# Patient Record
Sex: FEMALE | Race: WHITE | NOT HISPANIC OR LATINO | Employment: OTHER | URBAN - METROPOLITAN AREA
[De-identification: names, ages, dates, MRNs, and addresses within clinical notes are randomized per-mention and may not be internally consistent; named-entity substitution may affect disease eponyms.]

---

## 2017-02-03 ENCOUNTER — HOSPITAL ENCOUNTER (OUTPATIENT)
Dept: MAMMOGRAPHY | Facility: CLINIC | Age: 62
Discharge: HOME | End: 2017-02-03

## 2017-02-03 DIAGNOSIS — Z12.31 VISIT FOR SCREENING MAMMOGRAM: ICD-10-CM

## 2018-06-28 ENCOUNTER — HOSPITAL ENCOUNTER (OUTPATIENT)
Dept: MAMMOGRAPHY | Facility: CLINIC | Age: 63
Discharge: HOME | End: 2018-06-28

## 2018-06-28 DIAGNOSIS — Z12.31 VISIT FOR SCREENING MAMMOGRAM: ICD-10-CM

## 2019-07-23 ENCOUNTER — HOSPITAL ENCOUNTER (OUTPATIENT)
Dept: MAMMOGRAPHY | Facility: CLINIC | Age: 64
Discharge: HOME | End: 2019-07-23

## 2019-07-23 DIAGNOSIS — Z12.31 VISIT FOR SCREENING MAMMOGRAM: ICD-10-CM

## 2020-12-29 ENCOUNTER — HOSPITAL ENCOUNTER (OUTPATIENT)
Dept: MAMMOGRAPHY | Facility: CLINIC | Age: 65
Discharge: HOME | End: 2020-12-29

## 2020-12-29 DIAGNOSIS — Z12.31 VISIT FOR SCREENING MAMMOGRAM: ICD-10-CM

## 2021-05-01 ENCOUNTER — HEALTH MAINTENANCE LETTER (OUTPATIENT)
Age: 66
End: 2021-05-01

## 2021-05-26 ENCOUNTER — RECORDS - HEALTHEAST (OUTPATIENT)
Dept: ADMINISTRATIVE | Facility: CLINIC | Age: 66
End: 2021-05-26

## 2021-05-27 ENCOUNTER — RECORDS - HEALTHEAST (OUTPATIENT)
Dept: ADMINISTRATIVE | Facility: CLINIC | Age: 66
End: 2021-05-27

## 2021-05-29 ENCOUNTER — RECORDS - HEALTHEAST (OUTPATIENT)
Dept: ADMINISTRATIVE | Facility: CLINIC | Age: 66
End: 2021-05-29

## 2021-05-30 ENCOUNTER — RECORDS - HEALTHEAST (OUTPATIENT)
Dept: ADMINISTRATIVE | Facility: CLINIC | Age: 66
End: 2021-05-30

## 2021-06-16 PROBLEM — K82.4 GALLBLADDER POLYP: Status: ACTIVE | Noted: 2017-12-19

## 2021-06-16 PROBLEM — I72.8 SPLENIC ARTERY ANEURYSM (H): Status: ACTIVE | Noted: 2018-01-24

## 2021-06-16 PROBLEM — S83.209A CURRENT TEAR OF MENISCUS OF KNEE: Status: ACTIVE | Noted: 2017-06-16

## 2021-06-16 PROBLEM — M17.11 ARTHRITIS OF KNEE, RIGHT: Status: ACTIVE | Noted: 2017-06-16

## 2021-07-13 ENCOUNTER — RECORDS - HEALTHEAST (OUTPATIENT)
Dept: ADMINISTRATIVE | Facility: CLINIC | Age: 66
End: 2021-07-13

## 2021-07-21 ENCOUNTER — RECORDS - HEALTHEAST (OUTPATIENT)
Dept: ADMINISTRATIVE | Facility: CLINIC | Age: 66
End: 2021-07-21

## 2021-07-22 ENCOUNTER — RECORDS - HEALTHEAST (OUTPATIENT)
Dept: SCHEDULING | Facility: CLINIC | Age: 66
End: 2021-07-22

## 2021-07-22 DIAGNOSIS — Z12.31 OTHER SCREENING MAMMOGRAM: ICD-10-CM

## 2021-10-11 ENCOUNTER — HEALTH MAINTENANCE LETTER (OUTPATIENT)
Age: 66
End: 2021-10-11

## 2022-05-22 ENCOUNTER — HEALTH MAINTENANCE LETTER (OUTPATIENT)
Age: 67
End: 2022-05-22

## 2022-07-17 ENCOUNTER — HEALTH MAINTENANCE LETTER (OUTPATIENT)
Age: 67
End: 2022-07-17

## 2022-09-24 ENCOUNTER — HEALTH MAINTENANCE LETTER (OUTPATIENT)
Age: 67
End: 2022-09-24

## 2023-05-08 ENCOUNTER — HEALTH MAINTENANCE LETTER (OUTPATIENT)
Age: 68
End: 2023-05-08

## 2023-10-14 ENCOUNTER — HEALTH MAINTENANCE LETTER (OUTPATIENT)
Age: 68
End: 2023-10-14

## 2024-01-01 ENCOUNTER — HOSPITAL ENCOUNTER (EMERGENCY)
Facility: CLINIC | Age: 69
Discharge: HOME | End: 2024-01-01
Admitting: PHYSICIAN ASSISTANT
Payer: COMMERCIAL

## 2024-01-01 VITALS
DIASTOLIC BLOOD PRESSURE: 74 MMHG | HEART RATE: 72 BPM | OXYGEN SATURATION: 100 % | WEIGHT: 160 LBS | RESPIRATION RATE: 18 BRPM | TEMPERATURE: 98.3 F | BODY MASS INDEX: 24.69 KG/M2 | SYSTOLIC BLOOD PRESSURE: 161 MMHG

## 2024-01-01 DIAGNOSIS — H10.9 BACTERIAL CONJUNCTIVITIS OF BOTH EYES: ICD-10-CM

## 2024-01-01 DIAGNOSIS — H65.92 MIDDLE EAR EFFUSION, LEFT: ICD-10-CM

## 2024-01-01 DIAGNOSIS — B96.89 BACTERIAL CONJUNCTIVITIS OF BOTH EYES: ICD-10-CM

## 2024-01-01 DIAGNOSIS — J01.11 ACUTE RECURRENT FRONTAL SINUSITIS: ICD-10-CM

## 2024-01-01 PROCEDURE — 99284 EMERGENCY DEPT VISIT MOD MDM: CPT

## 2024-01-01 RX ORDER — LORATADINE 10 MG/1
10 TABLET ORAL DAILY
Qty: 7 TABLET | Refills: 0 | Status: SHIPPED | OUTPATIENT
Start: 2024-01-01 | End: 2024-01-08

## 2024-01-01 RX ORDER — OXYMETAZOLINE HYDROCHLORIDE 0.05 G/100ML
2 SPRAY NASAL 2 TIMES DAILY
Qty: 1 ML | Refills: 0 | Status: SHIPPED | OUTPATIENT
Start: 2024-01-01 | End: 2024-01-04

## 2024-01-01 RX ORDER — ERYTHROMYCIN 5 MG/G
0.5 OINTMENT OPHTHALMIC 4 TIMES DAILY
Qty: 3.5 G | Refills: 0 | Status: SHIPPED | OUTPATIENT
Start: 2024-01-01 | End: 2024-01-08

## 2024-01-01 ASSESSMENT — VISUAL ACUITY
OD: 20/40
OU: 20/40
OS: 20/40

## 2024-01-02 NOTE — ED PROVIDER NOTES
Emergency Department Encounter   NAME: Marni Lane ; AGE: 68 year old female ; YOB: 1955 ; MRN: [id number] ; PCP: Ana María David   ED PROVIDER: Blanca Aquino PA-C    Evaluation Date & Time:   No admission date for patient encounter.    CHIEF COMPLAINT:  Eye Drainage, Cough, and Pharyngitis      Impression and Plan   MDM:   Marni Lane is a 68 year old female with a history of hypertension who presents to the ED by walk-in for evaluation of eye drainage.  The patient has had 4 days of bilateral eye redness, irritation, drainage, nasal congestion, mild sore throat, and sinus pressure.  Her daughter is ill with similar symptoms, and was treated for a sinus infection with oral antibiotics.  Patient was seen at urgent care 3 days ago and was diagnosed with bilateral conjunctivitis and provided a prescription for Polytrim drops, however after starting these drops, she had irritation around both of her eyes so she discontinued them yesterday.  Here in the ER, this is a very well-appearing individual, who is pleasant, and in no acute distress.  She has bilateral conjunctival injection with purulent drainage, clinically consistent with bacterial conjunctivitis.  She has no periorbital swelling or edema concerning for preseptal cellulitis.  No pain behind the eyes or pain with EOMs -low suspicion for a septal cellulitis.  Obtained pressures in bilateral eyes which were within normal limit -no concern for acute open angle glaucoma.  She does have 20 out of 40 vision in her bilateral eyes with her glasses on, though suspect some of this may be due to her eye drainage. No significant pain, photophobia, or difficulty keeping eyes open. No evidence of iritis, uveitis or keratitis. She states that she had a full ophthalmologic exam several weeks ago which was unremarkable.  She has a relationship with her ophthalmologist and will contact them tomorrow to schedule close follow-up, though feel  ultimately she needs a change of her antibiotic drops.  Will switch her to erythromycin as she is a noncontact wearer.    She is having some tenderness to her right frontal sinus and has nasal congestion.  She states she has a history of recurrent sinus infections and has considered sinus surgery in the past.  As she has only had symptoms for 4 days without fever, discussed high likelihood that this is a viral sinusitis and will place her on Claritin and Afrin spray especially if she has a left middle ear effusion.  She is comfortable initiating these medications though would like to be prescribed antibiotics given her recurrent infections.  We discussed a prescription for Augmentin for home though not starting this unless her symptoms or not improving within the next 3 days she verbalized understanding.  We reviewed antibiotic side effects.  At this time, she is otherwise well-appearing.  Offered her viral swab, however she declined.  We reviewed concerning signs and symptoms return to the ED and she verbalized understanding.  Discharged home in stable condition.  Will have blood pressure rechecked in her primary care clinic.    Medical Decision Making    History:  Supplemental history from: Yes -    External Record(s) reviewed: Outpatient Record: [facility] on 29-Dec-2023    Work Up:  Chart documentation includes differential considered and any EKGs or imaging independently interpreted by provider, where specified.  In additional to work up documented, I considered the following work up: Documented in chart, if applicable.    External consultation:  Discussion of management with another provider: Documented in chart, if applicable    Complicating factors:  Care impacted by chronic illness: Hypertension  Care affected by social determinants of health: Access to care.     Disposition considerations: Discharge. I prescribed additional prescription strength medication(s) as charted.  See documentation for any additional details.      ED COURSE:  6:39 PM I met and introduced myself to the patient. I gathered initial history and performed my physical exam. We discussed plan for initial workup.   7:06 PM I checked the pressure of the patient's eyes. I discussed results, discharge, follow up, and reasons to return to the ED.     At the conclusion of the encounter I discussed the results of all the tests and the disposition. The questions were answered. The patient or family acknowledged understanding and was agreeable with the care plan.    FINAL IMPRESSION:    ICD-10-CM    1. Bacterial conjunctivitis of both eyes  H10.9     B96.89       2. Acute recurrent frontal sinusitis  J01.11       3. Middle ear effusion, left  H65.92             MEDICATIONS GIVEN IN THE EMERGENCY DEPARTMENT:  Medications - No data to display      NEW PRESCRIPTIONS STARTED AT TODAY'S ED VISIT:  Discharge Medication List as of 1/1/2024  7:34 PM        START taking these medications    Details   amoxicillin-clavulanate (AUGMENTIN) 875-125 MG tablet Take 1 tablet by mouth 2 times daily for 5 days, Disp-10 tablet, R-0, Local Print      erythromycin (ROMYCIN) 5 MG/GM ophthalmic ointment Place 0.5 inches into both eyes 4 times daily for 7 daysDisp-3.5 g, R-0Local Print      loratadine (CLARITIN) 10 MG tablet Take 1 tablet (10 mg) by mouth daily for 7 days, Disp-7 tablet, R-0, Local Print      oxymetazoline (AFRIN NASAL SPRAY) 0.05 % nasal spray Spray 2 sprays in nostril 2 times daily for 3 days, Disp-1 mL, R-0, Local PrintNo drip 12 hour formula please               HPI   Patient information was obtained from: patient    Use of Intrepreter: N/A     Marni Lane is a 68 year old female with a history of hypertension who presents to the ED by walk-in for evaluation of eye drainage.    Last Thursday (4 days ago) the patient began experiencing redness in her right eye. The next day, she presented to urgent care and was prescribed  antibiotics drops for her eye. The patient had an adverse reaction to the drops and discontinued them. The eye redness worsened, spreading to her left eye on Saturday; she also had bilateral pus and eye pain, as well as a  left sided headache that she has been taking advil and Tylenol for. Her eyes have felt more irritated and she has abnormally blurred vision, so she contacted her primary care provider today and was advised to present to the ER.    4 days ago, the patient also developed a sore throat, and today developed otalgia. She has a history of recurrent sinus infections, and her daughter who lives with her recently was diagnosed with a sinus infection. An at-home covid test was negative on Friday, and she recently had Covid at the end of October (about 2-2.5 months ago).    Her last eye exam was at the beginning of December (about 1 month ago). Patient had a codeine and calcium channel blocker allergy. She denies double vision, photophobia, pain behind her eyes, facial pressure, shortness of breath, dyspnea.    Per chart review, the patient presented to [facility] [place] on 29-Dec-2023 for evaluation of eye irritation. Diagnosed with bacterial conjunctivitis and subconjunctival hemorrhage of right eye.    REVIEW OF SYSTEMS:  Pertinent positive and negative symptoms per HPI.       Medical History     Past Medical History:   Diagnosis Date    Breast cyst Late 90's    Hypertension     Scleroma        Past Surgical History:   Procedure Laterality Date    BIOPSY BREAST Right     BREAST CYST ASPIRATION Left     HYSTERECTOMY  2001    OOPHORECTOMY  2001       Family History   Problem Relation Age of Onset    Cancer Father 80.00        Leukemia    Breast Cancer Maternal Aunt 65.00       Social History     Tobacco Use    Smoking status: Never   Substance Use Topics    Alcohol use: Yes     Comment: Alcoholic Drinks/day: occasional       amoxicillin-clavulanate (AUGMENTIN) 875-125 MG  tablet  erythromycin (ROMYCIN) 5 MG/GM ophthalmic ointment  loratadine (CLARITIN) 10 MG tablet  oxymetazoline (AFRIN NASAL SPRAY) 0.05 % nasal spray          Physical Exam     First Vitals:  Patient Vitals for the past 24 hrs:   BP Temp Temp src Pulse Resp SpO2 Weight   01/01/24 1953 (!) 161/74 -- -- 72 18 100 % --   01/01/24 1737 (!) 184/83 98.3  F (36.8  C) Temporal 74 14 100 % 72.6 kg (160 lb)         PHYSICAL EXAM:   Physical Exam  Vitals and nursing note reviewed.   Constitutional:       General: She is not in acute distress.     Appearance: She is not toxic-appearing.   HENT:      Head:      Comments: Tenderness over right frontal sinus.  No edema.     Right Ear: Tympanic membrane and ear canal normal.      Ears:      Comments: She has a clear left-sided middle ear effusion.  The TM is not erythematous, bulging or retracted.  No TM perforation and canals clear.     Nose: Rhinorrhea present.      Mouth/Throat:      Mouth: Mucous membranes are moist.      Comments: Uvula is midline without deviation.  No erythema, exudates, or tonsillar edema.  Eyes:      Comments: She has diffuse conjunctival injection, worse on the right compared to the left with purulent drainage.  Pupils are round, equal, and reactive to light without pain with direct or indirect light reflex.  EOMs are intact without pain.  No periorbital swelling or erythema.  Pressure in right eye 11 and 10 in the left eye.   Cardiovascular:      Rate and Rhythm: Normal rate and regular rhythm.      Heart sounds: Normal heart sounds.   Pulmonary:      Effort: Pulmonary effort is normal.      Breath sounds: Normal breath sounds. No wheezing, rhonchi or rales.   Musculoskeletal:      Cervical back: Normal range of motion and neck supple.   Skin:     General: Skin is warm.   Neurological:      Mental Status: She is alert.             Results     LAB:  All pertinent labs reviewed and interpreted  Labs Ordered and Resulted from Time of ED Arrival to Time of ED  Departure - No data to display    RADIOLOGY:  No orders to display         I, Denzel Gregory, am serving as a scribe to document services personally performed by Blanca Aquino PA-C, based on my observation and the provider's statements to me. I, Blanca Aquino PA-C attest that Denzel Gregory is acting in a scribe capacity, has observed my performance of the services and has documented them in accordance with my direction.       Blanca Aquino PA-C   Emergency Medicine   [facility] EMERGENCY ROOM       Blanca Aquino PA-C  01/01/24 2051       Blanca Aquino PA-C  01/01/24 2053

## 2024-01-02 NOTE — DISCHARGE INSTRUCTIONS
As we discussed, we will place you on a different antibiotic for your eye infection.  Please take and finish this as prescribed.    Your sinus infection is likely due to a virus.  Please use the daily Claritin and 3 days of Afrin sprays to help with your symptoms.  If your symptoms or not getting better in 3 days, you may start the antibiotic prescription provided.    If it anytime you develop a fever, redness or swelling around your eyes, worsening vision, pain behind the eyes,, or any new or concerning symptoms please return to the ER for further evaluation.    Your blood pressure was elevated in the emergencydepartment today and requires recheck and close follow-up in your primary care clinic. Untreated blood pressure can cause serious complications including, but not limited to stroke, heart attack/failure, and kidney disease.  Please make a close follow-up appointment to have this recheck performed. Please return to the emergency department immediately if you develop a severe headache, vision changes, chest pain, shortness of breath, orabdominal pain.

## 2024-01-02 NOTE — ED NOTES
Pt and spouse agree with discharge instructions. Will  prescriptions and use as directed. No other questions at this time. See providers notes for further assessment.

## 2024-09-19 ENCOUNTER — APPOINTMENT (OUTPATIENT)
Dept: CT IMAGING | Facility: CLINIC | Age: 69
End: 2024-09-19
Attending: FAMILY MEDICINE
Payer: COMMERCIAL

## 2024-09-19 PROCEDURE — 250N000011 HC RX IP 250 OP 636: Performed by: FAMILY MEDICINE

## 2024-09-19 PROCEDURE — 72131 CT LUMBAR SPINE W/O DYE: CPT

## 2024-09-19 PROCEDURE — 99285 EMERGENCY DEPT VISIT HI MDM: CPT | Mod: 25

## 2024-09-19 PROCEDURE — 250N000012 HC RX MED GY IP 250 OP 636 PS 637: Performed by: FAMILY MEDICINE

## 2024-09-19 PROCEDURE — 96372 THER/PROPH/DIAG INJ SC/IM: CPT | Performed by: FAMILY MEDICINE

## 2024-09-19 RX ORDER — KETOROLAC TROMETHAMINE 30 MG/ML
30 INJECTION, SOLUTION INTRAMUSCULAR; INTRAVENOUS ONCE
Status: COMPLETED | OUTPATIENT
Start: 2024-09-19 | End: 2024-09-19

## 2024-09-19 RX ORDER — PREDNISONE 20 MG/1
40 TABLET ORAL ONCE
Status: COMPLETED | OUTPATIENT
Start: 2024-09-19 | End: 2024-09-19

## 2024-09-19 RX ORDER — ORPHENADRINE CITRATE 30 MG/ML
30 INJECTION INTRAMUSCULAR; INTRAVENOUS ONCE
Status: COMPLETED | OUTPATIENT
Start: 2024-09-19 | End: 2024-09-19

## 2024-09-19 RX ORDER — OXYCODONE HYDROCHLORIDE 5 MG/1
5 TABLET ORAL EVERY 6 HOURS PRN
Qty: 6 TABLET | Refills: 0 | Status: SHIPPED | OUTPATIENT
Start: 2024-09-19 | End: 2024-09-20

## 2024-09-19 RX ORDER — LORAZEPAM 0.5 MG/1
0.5 TABLET ORAL ONCE
Status: COMPLETED | OUTPATIENT
Start: 2024-09-19 | End: 2024-09-20

## 2024-09-19 RX ORDER — GABAPENTIN 300 MG/1
300 CAPSULE ORAL 3 TIMES DAILY
Qty: 30 CAPSULE | Refills: 0 | Status: SHIPPED | OUTPATIENT
Start: 2024-09-19

## 2024-09-19 RX ORDER — PREDNISONE 10 MG/1
TABLET ORAL
Qty: 22 TABLET | Refills: 0 | Status: SHIPPED | OUTPATIENT
Start: 2024-09-19 | End: 2024-09-30

## 2024-09-19 RX ADMIN — PREDNISONE 40 MG: 20 TABLET ORAL at 22:31

## 2024-09-19 RX ADMIN — ORPHENADRINE CITRATE 30 MG: 60 INJECTION INTRAMUSCULAR; INTRAVENOUS at 21:47

## 2024-09-19 RX ADMIN — KETOROLAC TROMETHAMINE 30 MG: 30 INJECTION, SOLUTION INTRAMUSCULAR at 21:45

## 2024-09-19 ASSESSMENT — COLUMBIA-SUICIDE SEVERITY RATING SCALE - C-SSRS
6. HAVE YOU EVER DONE ANYTHING, STARTED TO DO ANYTHING, OR PREPARED TO DO ANYTHING TO END YOUR LIFE?: NO
1. IN THE PAST MONTH, HAVE YOU WISHED YOU WERE DEAD OR WISHED YOU COULD GO TO SLEEP AND NOT WAKE UP?: NO
2. HAVE YOU ACTUALLY HAD ANY THOUGHTS OF KILLING YOURSELF IN THE PAST MONTH?: NO

## 2024-09-20 ENCOUNTER — HOSPITAL ENCOUNTER (EMERGENCY)
Facility: CLINIC | Age: 69
Discharge: HOME | End: 2024-09-20
Attending: FAMILY MEDICINE | Admitting: FAMILY MEDICINE
Payer: COMMERCIAL

## 2024-09-20 VITALS
HEART RATE: 76 BPM | OXYGEN SATURATION: 96 % | SYSTOLIC BLOOD PRESSURE: 190 MMHG | TEMPERATURE: 97.7 F | RESPIRATION RATE: 17 BRPM | HEIGHT: 67 IN | DIASTOLIC BLOOD PRESSURE: 86 MMHG | WEIGHT: 160 LBS | BODY MASS INDEX: 25.11 KG/M2

## 2024-09-20 DIAGNOSIS — S39.012A LUMBAR STRAIN, INITIAL ENCOUNTER: ICD-10-CM

## 2024-09-20 DIAGNOSIS — M51.26 LUMBAR HERNIATED DISC: ICD-10-CM

## 2024-09-20 PROCEDURE — 250N000013 HC RX MED GY IP 250 OP 250 PS 637: Performed by: STUDENT IN AN ORGANIZED HEALTH CARE EDUCATION/TRAINING PROGRAM

## 2024-09-20 PROCEDURE — 250N000013 HC RX MED GY IP 250 OP 250 PS 637: Performed by: FAMILY MEDICINE

## 2024-09-20 RX ORDER — CYCLOBENZAPRINE HCL 10 MG
10 TABLET ORAL
Qty: 10 TABLET | Refills: 0 | Status: SHIPPED | OUTPATIENT
Start: 2024-09-20 | End: 2024-09-26

## 2024-09-20 RX ORDER — ACETAMINOPHEN 325 MG/1
975 TABLET ORAL ONCE
Status: COMPLETED | OUTPATIENT
Start: 2024-09-20 | End: 2024-09-20

## 2024-09-20 RX ORDER — ONDANSETRON 2 MG/ML
4 INJECTION INTRAMUSCULAR; INTRAVENOUS ONCE
Status: DISCONTINUED | OUTPATIENT
Start: 2024-09-20 | End: 2024-09-20

## 2024-09-20 RX ORDER — MORPHINE SULFATE 4 MG/ML
4 INJECTION, SOLUTION INTRAMUSCULAR; INTRAVENOUS ONCE
Status: DISCONTINUED | OUTPATIENT
Start: 2024-09-20 | End: 2024-09-20

## 2024-09-20 RX ADMIN — LORAZEPAM 0.5 MG: 0.5 TABLET ORAL at 00:20

## 2024-09-20 RX ADMIN — ACETAMINOPHEN 975 MG: 325 TABLET ORAL at 01:42

## 2024-09-20 ASSESSMENT — ACTIVITIES OF DAILY LIVING (ADL): ADLS_ACUITY_SCORE: 33

## 2024-09-20 NOTE — DISCHARGE INSTRUCTIONS
As we discussed, please take the medication as prescribed including the steroid, gabapentin, as well as Tylenol and ibuprofen for pain.    Please take 500mg of tylenol every 4 hours as well as 600mg of ibuprofen every 6 hours for pain. Do not take more than 4,000mg of tylenol in 24hrs.    I would also ice your back for the next few days at least 3-5 times daily.    Return for any bowel or bladder incontinence or retention, leg weakness, numbness in your genital region, or any other concerning symptoms.    The spine center should be reaching out to tomorrow morning.

## 2024-09-20 NOTE — Clinical Note
Marni Lane was seen and treated in our emergency department on 9/19/2024.  She may return to work on 09/23/2024.       If you have any questions or concerns, please don't hesitate to call.      Ohl, Varghese Colindres, DO

## 2024-09-20 NOTE — ED NOTES
"EMERGENCY DEPARTMENT SIGN OUT NOTE        ED COURSE AND MEDICAL DECISION MAKING  Patient was signed out to me by Dr Lawson Carter at 10:50 PM    In brief, Marni Lane is a 69 year old female who initially presented with back injury.     At time of sign out, disposition was pending MRI    Additional ED Course Timestamps    Reevaluation of patient approximately 1:30 AM: Patient unable to tolerate MRI laying flat because of increasing right thigh and posterior thigh pain.  Continues to deny any red flag symptoms.  Patient requesting to leave with outpatient follow-up reporting that she would like to see if the medications can \"soaking\" and then she can tolerate MRI.  I think this is completely reasonable.  Repeat physical examination showing again no weakness or red flag symptoms.  Rapid referral given for spine center      FINAL IMPRESSION    1. Lumbar strain, initial encounter        ED MEDS  Medications   ketorolac (TORADOL) injection 30 mg (30 mg Intramuscular $Given 9/19/24 2145)   orphenadrine (NORFLEX) injection 30 mg (30 mg Intramuscular $Given 9/19/24 2147)   predniSONE (DELTASONE) tablet 40 mg (40 mg Oral $Given 9/19/24 2231)       LAB  Labs Ordered and Resulted from Time of ED Arrival to Time of ED Departure - No data to display      RADIOLOGY    CT Lumbar Spine w/o Contrast   Final Result   IMPRESSION:   1.  No fracture or posttraumatic subluxation.   2.  Appearance of spinal canal or at L3-4 suggests there is a large disc protrusion with marked canal narrowing. This is not well-visualized without intrathecal contrast. MRI lumbar spine recommended.      Lumbar spine MRI w/o contrast    (Results Pending)       DISCHARGE MEDS  New Prescriptions    No medications on file       [facility] EMERGENCY ROOM  [address] [id number] [phone]         Ohl, Varghese Colindres,   09/20/24 0136    "

## 2024-09-20 NOTE — ED NOTES
Pt unable to complete MRI, having too much pain.  Pt reports allergy to codeine is hallucinations and vomiting, so will no give IV morphine. Pt will go back to ED for update on plan of care.

## 2024-09-20 NOTE — ED TRIAGE NOTES
Pt presents to ED with c/o lower back pain that started when she was squatting down in her fridge to replace a shelf.  Pt notes a pulling sensation alonger her entire lower back.  Now having pain that radiates into her RLE.  No previous injuries/back problems.  Pt took 2 advil around 1700.  Pain 2/10 when sitting, up to 10/10 when attempting to ambulate/      Triage Assessment (Adult)       Row Name 09/19/24 2049          Triage Assessment    Airway WDL WDL        Respiratory WDL    Respiratory WDL WDL        Skin Circulation/Temperature WDL    Skin Circulation/Temperature WDL WDL        Cardiac WDL    Cardiac WDL WDL        Peripheral/Neurovascular WDL    Peripheral Neurovascular WDL WDL        Cognitive/Neuro/Behavioral WDL    Cognitive/Neuro/Behavioral WDL WDL

## 2024-09-20 NOTE — ED PROVIDER NOTES
EMERGENCY DEPARTMENT ENCOUNTER      NAME: Marni Lane  AGE: 69 year old female  YOB: 1955  MRN: [id number]  EVALUATION DATE & TIME: No admission date for patient encounter.    PCP: Ana María David    ED PROVIDER: Lawson Carter M.D.    Chief Complaint   Patient presents with    Back Injury       FINAL IMPRESSION:  1. Lumbar strain, initial encounter    2. Lumbar herniated disc        ED COURSE & MEDICAL DECISION MAKING:    Pertinent Labs & Imaging studies independently interpreted by me. (See chart for details)  Reviewed most recent urgent care visit from March 17 which was for upper respiratory infection and sinusitis.    ED Course as of 09/19/24 2258   Thu Sep 19, 2024   2130 Patient seen and examined, presents with low back pain today after bending forward.  Has pain down the leg but no numbness, weakness.  Pain is worse with walking.  No bowel or bladder dysfunction.  On exam here, vitally stable, right low lumbar paraspinous and buttock tenderness.  Strength and sensation of the right lower extremity intact.  Consider MRI but no red flag symptoms.  CT scan ordered to evaluate for possible compression fracture, Toradol and Norflex IM given and anticipate discharge with outpatient follow-up.   2204 CT scan of the lumbar spine independently interpreted by me does not demonstrate acute bony abnormality.     2220 Reviewed radiology interpretation of CT scan with concern for large disc bulge with canal narrowing, MRI is ordered for further evaluation although patient has no focal neurologic deficits   2250 Signout to oncoming provider pending MRI.         At the conclusion of the encounter I discussed the results of all of the tests and the disposition. The questions were answered. The patient or family acknowledged understanding and was agreeable with the care plan.     Medical Decision Making  Obtained supplemental history:Supplemental history obtained?: No  Reviewed external records:  External records reviewed?: Documented in chart  Care impacted by chronic illness:Hypertension  Care significantly affected by social determinants of health:Access to Medical Care  Did you consider but not order tests?: Work up considered but not performed and documented in chart, if applicable  Did you interpret images independently?: Independent interpretation of ECG and images noted in documentation, when applicable.  Consultation discussion with other provider:Did you involve another provider (consultant, , pharmacy, etc.)?: I discussed the care with another health care provider, see documentation for details.  Admission considered. Patient was signed out to the oncoming physician, disposition pending.  Not Applicable      PROCEDURES:       MEDICATIONS GIVEN IN THE EMERGENCY:  Medications   ketorolac (TORADOL) injection 30 mg (30 mg Intramuscular $Given 9/19/24 2145)   orphenadrine (NORFLEX) injection 30 mg (30 mg Intramuscular $Given 9/19/24 2147)   predniSONE (DELTASONE) tablet 40 mg (40 mg Oral $Given 9/19/24 2231)       NEW PRESCRIPTIONS STARTED AT TODAY'S ER VISIT  New Prescriptions    GABAPENTIN (NEURONTIN) 300 MG CAPSULE    Take 1 capsule (300 mg) by mouth 3 times daily.    OXYCODONE (ROXICODONE) 5 MG TABLET    Take 1 tablet (5 mg) by mouth every 6 hours as needed for severe pain.    PREDNISONE (DELTASONE) 10 MG TABLET    Take 4 tablets (40 mg) by mouth daily for 3 days, THEN 2 tablets (20 mg) daily for 3 days, THEN 1 tablet (10 mg) daily for 3 days, THEN 0.5 tablets (5 mg) daily for 2 days.       =================================================================    HPI    Patient information was obtained from: patient      Marni Lane is a 69 year old female with a pertinent history of HTN who presents to this ED via private vehicle with spouse for evaluation of back injury.    Patient reports at 4:30 PM today, she was squatting and cleaning her refrigerator to put the  when she  suddenly felt a pulling/strained sensation in her right lower back. Since then, she endorses persistent right lower back pain that radiates down her right leg. She is unable to walk/bear weight secondary to the pain. She did take advil at 5 PM. She denies history of previous back injuries. She otherwise denies associating numbness or tingling in her extremities and abdominal pain. There were no other concerns/complaints at this time.      REVIEW OF SYSTEMS   Review of Systems   All other systems reviewed and negative    PAST MEDICAL HISTORY:  Past Medical History:   Diagnosis Date    Breast cyst Late 90's    Several on both breasts    Hypertension     Scleroma        PAST SURGICAL HISTORY:  Past Surgical History:   Procedure Laterality Date    BIOPSY BREAST Right     BREAST CYST ASPIRATION Left     HYSTERECTOMY  2001    OOPHORECTOMY  2001       CURRENT MEDICATIONS:    No current facility-administered medications for this encounter.     Current Outpatient Medications   Medication Sig Dispense Refill    gabapentin (NEURONTIN) 300 MG capsule Take 1 capsule (300 mg) by mouth 3 times daily. 30 capsule 0    oxyCODONE (ROXICODONE) 5 MG tablet Take 1 tablet (5 mg) by mouth every 6 hours as needed for severe pain. 6 tablet 0    predniSONE (DELTASONE) 10 MG tablet Take 4 tablets (40 mg) by mouth daily for 3 days, THEN 2 tablets (20 mg) daily for 3 days, THEN 1 tablet (10 mg) daily for 3 days, THEN 0.5 tablets (5 mg) daily for 2 days. 22 tablet 0       ALLERGIES:  Allergies   Allergen Reactions    Calcium Channel Blockers Swelling and Other (See Comments)     LE swelling    Codeine Unknown    Hydrocodone Nausea and Vomiting       FAMILY HISTORY:  Family History   Problem Relation Age of Onset    Cancer Father 80.00        Leukemia    Breast Cancer Maternal Aunt 65.00       SOCIAL HISTORY:   Social History     Socioeconomic History    Marital status:      Spouse name: None    Number of children: None    Years of  "education: None    Highest education level: None   Tobacco Use    Smoking status: Never   Substance and Sexual Activity    Alcohol use: Yes     Comment: Alcoholic Drinks/day: occasional       VITALS:  BP (!) 159/69   Pulse 80   Temp 97.7  F (36.5  C)   Resp 17   Ht 1.702 m (5' 7\")   Wt 72.6 kg (160 lb)   SpO2 100%   BMI 25.06 kg/m      PHYSICAL EXAM:  Physical Exam  Vitals and nursing note reviewed.   Constitutional:       Appearance: Normal appearance.   HENT:      Head: Normocephalic and atraumatic.      Right Ear: External ear normal.      Left Ear: External ear normal.      Nose: Nose normal.      Mouth/Throat:      Mouth: Mucous membranes are moist.   Eyes:      Extraocular Movements: Extraocular movements intact.      Conjunctiva/sclera: Conjunctivae normal.      Pupils: Pupils are equal, round, and reactive to light.   Cardiovascular:      Rate and Rhythm: Normal rate and regular rhythm.   Pulmonary:      Effort: Pulmonary effort is normal.      Breath sounds: Normal breath sounds. No wheezing or rales.   Abdominal:      General: Abdomen is flat. There is no distension.      Palpations: Abdomen is soft.      Tenderness: There is no abdominal tenderness. There is no guarding.   Musculoskeletal:         General: Normal range of motion.      Cervical back: Normal range of motion and neck supple.      Lumbar back: Tenderness (right low lumbar and sciatic tenderness) present.      Right lower leg: No edema.      Left lower leg: No edema.   Lymphadenopathy:      Cervical: No cervical adenopathy.   Skin:     General: Skin is warm and dry.   Neurological:      General: No focal deficit present.      Mental Status: She is alert and oriented to person, place, and time. Mental status is at baseline.      Comments: No gross focal neurologic deficits   Psychiatric:         Mood and Affect: Mood normal.         Behavior: Behavior normal.         Thought Content: Thought content normal.          LAB:  All pertinent " labs reviewed and interpreted.  Results for orders placed or performed during the hospital encounter of 09/19/24   CT Lumbar Spine w/o Contrast    Impression    IMPRESSION:  1.  No fracture or posttraumatic subluxation.  2.  Appearance of spinal canal or at L3-4 suggests there is a large disc protrusion with marked canal narrowing. This is not well-visualized without intrathecal contrast. MRI lumbar spine recommended.       RADIOLOGY:  Reviewed all pertinent imaging. Please see official radiology report.  CT Lumbar Spine w/o Contrast   Final Result   IMPRESSION:   1.  No fracture or posttraumatic subluxation.   2.  Appearance of spinal canal or at L3-4 suggests there is a large disc protrusion with marked canal narrowing. This is not well-visualized without intrathecal contrast. MRI lumbar spine recommended.      Lumbar spine MRI w/o contrast    (Results Pending)       I, Annetta Ambrose, am serving as a scribe to document services personally performed by Dr. Carter based on my observation and the provider's statements to me. I, Lawson Carter MD attest that Annetta Ambrose is acting in a scribe capacity, has observed my performance of the services and has documented them in accordance with my direction.    Lawson Carter M.D.  Emergency Medicine  [facility] EMERGENCY ROOM  [id number] [facility] [id number] [phone]  Dept: [phone]       Lawson Carter MD  09/19/24 1696

## 2024-12-01 ENCOUNTER — HEALTH MAINTENANCE LETTER (OUTPATIENT)
Age: 69
End: 2024-12-01

## 2025-08-01 NOTE — ED TRIAGE NOTES
Pt was in urgent care last Friday dx with pink eye to right eye and then went to left eye was given eye drops. Pt states stopped eye drops because was having a reaction has stingy to eyes. Pt c/o left ear pain, sore throat, congestion and coughing.  Pt states her eyes are getting worse and having a lot of discharge from eyes.          complains of pain/discomfort